# Patient Record
Sex: FEMALE | Race: ASIAN | NOT HISPANIC OR LATINO | URBAN - METROPOLITAN AREA
[De-identification: names, ages, dates, MRNs, and addresses within clinical notes are randomized per-mention and may not be internally consistent; named-entity substitution may affect disease eponyms.]

---

## 2017-02-16 ENCOUNTER — OFFICE VISIT - RIVER FALLS (OUTPATIENT)
Dept: FAMILY MEDICINE | Facility: CLINIC | Age: 57
End: 2017-02-16

## 2017-02-16 ASSESSMENT — MIFFLIN-ST. JEOR: SCORE: 1080.93

## 2017-02-23 ENCOUNTER — OFFICE VISIT - RIVER FALLS (OUTPATIENT)
Dept: FAMILY MEDICINE | Facility: CLINIC | Age: 57
End: 2017-02-23

## 2017-02-23 ASSESSMENT — MIFFLIN-ST. JEOR: SCORE: 1071.86

## 2017-03-02 ENCOUNTER — OFFICE VISIT - RIVER FALLS (OUTPATIENT)
Dept: FAMILY MEDICINE | Facility: CLINIC | Age: 57
End: 2017-03-02

## 2017-03-02 ASSESSMENT — MIFFLIN-ST. JEOR: SCORE: 1074.58

## 2017-03-10 ENCOUNTER — OFFICE VISIT - RIVER FALLS (OUTPATIENT)
Dept: FAMILY MEDICINE | Facility: CLINIC | Age: 57
End: 2017-03-10

## 2017-03-10 ASSESSMENT — MIFFLIN-ST. JEOR: SCORE: 1072.77

## 2017-04-06 ENCOUNTER — OFFICE VISIT - RIVER FALLS (OUTPATIENT)
Dept: FAMILY MEDICINE | Facility: CLINIC | Age: 57
End: 2017-04-06

## 2017-04-06 ASSESSMENT — MIFFLIN-ST. JEOR: SCORE: 1081.84

## 2022-02-11 VITALS
SYSTOLIC BLOOD PRESSURE: 130 MMHG | BODY MASS INDEX: 24.62 KG/M2 | HEART RATE: 64 BPM | DIASTOLIC BLOOD PRESSURE: 80 MMHG | HEIGHT: 61 IN | TEMPERATURE: 97.5 F | WEIGHT: 128.4 LBS | BODY MASS INDEX: 24.24 KG/M2 | SYSTOLIC BLOOD PRESSURE: 146 MMHG | TEMPERATURE: 97.9 F | WEIGHT: 130.4 LBS | BODY MASS INDEX: 24.35 KG/M2 | DIASTOLIC BLOOD PRESSURE: 84 MMHG | TEMPERATURE: 97.6 F | SYSTOLIC BLOOD PRESSURE: 136 MMHG | HEART RATE: 58 BPM | HEART RATE: 52 BPM | HEIGHT: 61 IN | HEIGHT: 61 IN | DIASTOLIC BLOOD PRESSURE: 82 MMHG | WEIGHT: 129 LBS

## 2022-02-11 VITALS
BODY MASS INDEX: 24.28 KG/M2 | HEART RATE: 60 BPM | WEIGHT: 128.6 LBS | SYSTOLIC BLOOD PRESSURE: 110 MMHG | WEIGHT: 130.6 LBS | TEMPERATURE: 97.3 F | HEIGHT: 61 IN | HEART RATE: 76 BPM | SYSTOLIC BLOOD PRESSURE: 114 MMHG | DIASTOLIC BLOOD PRESSURE: 76 MMHG | BODY MASS INDEX: 24.66 KG/M2 | TEMPERATURE: 97.8 F | DIASTOLIC BLOOD PRESSURE: 70 MMHG | HEIGHT: 61 IN

## 2022-02-16 NOTE — LETTER
(Inserted Image. Unable to display)   February 21, 2019      JENNY ALAS  [address] [place] [address]        Dear JENNY,      Thank you for selecting [facility] for your healthcare needs.     Given that the EGD in November 2016 showed gastric erosions and intestinal metaplasia we had discussed a follow up EGD in November 2018. Please make an appointment to discuss.          Please contact me or my assistant at [phone] if you have any questions or concerns.     Sincerely,        Latrell Kline MD

## 2022-02-16 NOTE — PROGRESS NOTES
Patient:   JENNY ALAS            MRN: [id number]            FIN: [id number]               Age:   56 years     Sex:  Female     :  1960   Associated Diagnoses:   Preoperative examination; Right shoulder pain   Author:   Gunnar Kline MD      Preoperative Information   Indication for surgery:  Right shoulder pain.       Chief Complaint   2017 1:55 PM CDT     Preop DOS 17 right shoulder  Dr. Gaspar @  [facility]   Slipped and fell walking her dog landing on the right shoulder. Has persistent pain and weakness.  No history of heart disease. Taking lipitor for over 10 years (family history of hyperlipidemia).         Review of Systems   Constitutional:  No fever, No chills, No sweats.    Ear/Nose/Mouth/Throat:  Negative.    Respiratory:  No shortness of breath.    Cardiovascular:  No chest pain.    Gastrointestinal:  No nausea, No vomiting, No diarrhea.    Genitourinary:  Negative.    Musculoskeletal:  Negative.    Integumentary:  No rash.    Neurologic:  Alert and oriented X4, No headache.    Psychiatric:  Negative.    All other systems reviewed and negative   Last mens2015.    No history of bleeding disorders or blood transfusion  No prior problems with anesthesia  No family history of anesthesia problems  No positive responses for sleep apnea  Denies plavix, coumadin  Denies history of DVT/PE  Denies recent corticosteroid use    Able to walk a flight of stairs without chest pain or shortness of breath.  Nonsmoker  Stopped drinking alcohol with stomach ulcers in November.      Health Status   Allergies:    Allergic Reactions (Selected)  Severity Not Documented  Penicillin (No reactions were documented)   Medications:  (Selected)   Prescriptions  Prescribed  Naprosyn 500 mg oral tablet: 1 tab(s) ( 500 mg ), PO, BID, # 28 tab(s), 0 Refill(s), Type: Maintenance, Pharmacy: [id number], 1 tab(s) po bid,x14 day(s)  Protonix 40 mg oral delayed release tablet: 1 tab(s) (  40 mg ), po, daily, # 60 tab(s), 2 Refill(s), Type: Maintenance, Pharmacy: [facility] [id number], 1 tab(s) po daily  atorvastatin 40 mg oral tablet: 1 tab(s) ( 40 mg ), po, daily, Instructions: Due for fasting labs and yearly physical, # 90 tab(s), 3 Refill(s), Type: Maintenance, Pharmacy: [facility] [id number], 1 tab(s) po daily,Instr:Due for fasting labs and yearly physical  citalopram 40 mg oral tablet: See Instructions, Instructions: TAKE 1 TABLET BY MOUTH DAILY, # 90 tab(s), 2 Refill(s), Type: Soft Stop, Pharmacy: [facility] [id number], TAKE 1 TABLET BY MOUTH DAILY   Problem list:    All Problems  Abnormal Pap Smear / ICD-9-.9 / Confirmed  Hypercholesteremia / ICD-9-.0 / Confirmed  Mild Depression / ICD-9-.21 / Confirmed  Inactive: Tobacco abuse / ICD-9-.1  Resolved: Normal hearing eval 11      Histories   Family History: Dad  stomach cancer age 79. Mom  Lymphoma age 77.   Procedure history:    Esophagogastroduodenoscopy (SNOMED CT 074768680) performed by Gunnar Kline MD on 11/15/2016 at 56 Years.  Comments:  2017 1:59 PM - Lisa Bello  Indication:  gastric ulcers.  Sedation: Versed 2 mg, Fentanyl 50 mcg, 2 sprays of Cetacaine spray.  MMT (Medial Meniscus Tear) (ICD-9-.0) performed by Jasiel Badillo MD on 2015 at 54 Years.  Comments:  2015 8:01 AM - Lisa Bello  Right knee lateral.  Colonoscopy (SNOMED CT 116506842) on 3/10/2011 at 50 Years.  Comments:  3/21/2011 8:44 AM - Ugo Narvaez MA  Needs MAC sedation with next colonoscopy    3/11/2011 3:39 PM - Ugo Narvaez MA  Normal colonoscopy repeat in 10 years  benign lump removed from neck.  benign lump in breast removed.  Childbirth (SNOMED CT 8085981521).  Comments:  3/1/2012 11:30 AM - Barbara Shepherd  Colonoscopy 2011 repeat 10 years.   Social History:  ([place]). [place] . 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    Eye:  Normal conjunctiva.    HENT:  Normocephalic, Tympanic membranes are clear.    Neck:  Non-tender, No lymphadenopathy.    Respiratory:  Lungs are clear to auscultation, Breath sounds are equal.    Cardiovascular:  Normal rate, Regular rhythm, No murmur.    Gastrointestinal:  Soft, Non-tender, Normal bowel sounds, No organomegaly.    Musculoskeletal:  Normal range of motion, Normal gait.    Integumentary:  Warm, Dry, Pink.    Neurologic:  Alert, Oriented, Normal sensory, No focal deficits.    Psychiatric:  Cooperative, Appropriate mood & affect.       Impression and Plan   Diagnosis     Preoperative examination (HGU01-BV Z01.818).     Right shoulder pain (YHR01-EC M25.511).       Recommend stopping naprosyn a week before the procedure.  No contraindications to anesthesia

## 2022-02-16 NOTE — PROGRESS NOTES
Chief Complaint  Had a fall 5 days ago. Right shoulder pain since and pain is getting worse  History of Present Illness  Fell down and landed on the right shoulder. Pain is increasing. Injury happened a week ago walking dog slipping on ice. Had been able to move it well the first few days. Has been having decreased ROM with increasing pain. Having a hard time lifting her fork  Review of Systems  Denies weakness in right arm  Denies numbness and tingling  Rarely has epigastric pain and taking the protonix  Problem List/Past Medical History  Ongoing  Abnormal Pap Smear  Hypercholesteremia  Mild Depression  Resolved  Normal hearing eval 2/21/11  Procedure/Surgical History  Esophagogastroduodenoscopy (11/15/2016),  MMT (Medial Meniscus Tear) (01/20/2015),  Colonoscopy (03/10/2011),  benign lump in breast removed,  benign lump removed from neck,  Childbirth,  Colonoscopy 2011 repeat 10 years.  Home Medications  atorvastatin 40 mg oral tablet, 40 mg, 1 tab(s), po, daily, 3 refills  Carafate 1 g oral tablet, 1 gm, 1 tab(s), po, qid  citalopram 40 mg oral tablet, 2 refills  Protonix 40 mg oral delayed release tablet, 40 mg, 1 tab(s), po, daily, 2 refills  Allergies  penicillin  Social History  Home and Environment  Marital status: .  Tobacco  Past  Family History  Carcinoma: Father.  Carcinoma in situ of body of stomach: Father.  Immunizations  Physical Exam  Vitals & Measurements  T:?97.6?(Tympanic)?  HR:?52?(Peripheral)?  BP:?146/82?  HT:?60.5?in?  WT:?130.4?lb?  BMI:?25.05?  General: No acute distress  Musculoskeletal: Able to?abduct the right shoulder 90?. ?Limited more by pain and weakness. ?Does have strength in the rotator cuff muscles. ?There is tenderness over the superior point shoulder  Skin: No bruising or swelling  Lab Results  Diagnostic Results  Right shoulder x-ray: No fracture and images reviewed with patient  No evidence of rotator cuff tear at this time.  Assessment/Plan  Right shoulder  pain  Likely contusion. ?Will take Naprosyn twice daily for 2 weeks and follow-up in 7 days but sooner if worse.? Given restrictions with no lifting with the right arm. 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 Sling for comfort.  Orders:  naproxen, 1 tab(s) ( 500 mg ), PO, BID, # 28 tab(s), 0 Refill(s), Type: Maintenance, Pharmacy: [facility] [id number], 1 tab(s) po bid,x14 day(s)  XR Shoulder Right (Request)

## 2022-02-16 NOTE — PROGRESS NOTES
Patient:   JENNY ALAS            MRN: [id number]            FIN: [id number]               Age:   56 years     Sex:  Female     :  1960   Associated Diagnoses:   Contusion of right shoulder   Author:   Gunnar Kline MD      Visit Information      Date of Service: 03/10/2017 12:56 pm  Performing Location: [facility] [place]  Encounter#: [id number]      Primary Care Provider (PCP):  [id number] -UNKNOWN,      Referring Provider:  No referring provider recorded for selected visit.      Chief Complaint   3/10/2017 1:00 PM CST    Right shoulder follow up, same pain, not better        History of Present Illness   Fell down and landed on the right shoulder. Pain is decreasing. Injury happened one month ago walking dog slipping on ice. Had been able to move it well the first few days. Since starting naprosyn the pain is decreased and range of motion improved. Feels pain is limiting her and may have some weakness as difficult opening jars.      Review of Systems   Some numbness and tingling with sleeping  No fevers         Health Status   Allergies:    Allergic Reactions (Selected)  Severity Not Documented  Penicillin (No reactions were documented)   Medications:  (Selected)   Prescriptions  Prescribed  Naprosyn 500 mg oral tablet: 1 tab(s) ( 500 mg ), PO, BID, # 28 tab(s), 0 Refill(s), Type: Maintenance, Pharmacy: [id number], 1 tab(s) po bid,x14 day(s)  Protonix 40 mg oral delayed release tablet: 1 tab(s) ( 40 mg ), po, daily, # 60 tab(s), 2 Refill(s), Type: Maintenance, Pharmacy: [facility] [id number], 1 tab(s) po daily  atorvastatin 40 mg oral tablet: 1 tab(s) ( 40 mg ), po, daily, Instructions: Due for fasting labs and yearly physical, # 90 tab(s), 3 Refill(s), Type: Maintenance, Pharmacy: [facility] [id number], 1 tab(s) po daily,Instr:Due for fasting labs and yearly physical  citalopram 40 mg oral tablet: See Instructions, Instructions: TAKE 1 TABLET BY MOUTH DAILY, # 90 tab(s), 2  Refill(s), Type: Soft Stop, Pharmacy: [facility] [id number], TAKE 1 TABLET BY MOUTH DAILY   Problem list:    All Problems  Abnormal Pap Smear / ICD-9-.9 / Confirmed  Hypercholesteremia / ICD-9-.0 / Confirmed  Mild Depression / ICD-9-.21 / Confirmed  Inactive: Tobacco abuse / ICD-9-.1  Resolved: Normal hearing eval 11      Histories   Procedure history:    Esophagogastroduodenoscopy (SNOMED CT 285222054) performed by Gunnar Kline MD on 11/15/2016 at 56 Years.  Comments:  2017 1:59 PM - Lisa Bello  Indication:  gastric ulcers.  Sedation: Versed 2 mg, Fentanyl 50 mcg, 2 sprays of Cetacaine spray.  MMT (Medial Meniscus Tear) (ICD-9-.0) performed by Jasiel Badillo MD on 2015 at 54 Years.  Comments:  2015 8:01 AM - Lisa Bello  Right knee lateral.  Colonoscopy (SNOMED CT 81959) on 3/10/2011 at 50 Years.  Comments:  3/21/2011 8:44 AM - Ugo Narvaez MA  Needs MAC sedation with next colonoscopy    3/11/2011 3:39 PM - Ugo Narvaez MA  Normal colonoscopy repeat in 10 years  benign lump removed from neck.  benign lump in breast removed.  Childbirth (SNOMED CT 1746268409).  Comments:  3/1/2012 11:30 AM - Barbara Shepherd  Colonoscopy 2011 repeat 10 years.   Social History:       Physical Examination   Vital Signs   3/10/2017 1:00 PM CST Systolic Blood Pressure 114 mmHg    Diastolic Blood Pressure 76 mmHg      General: No acute distress  Musculoskeletal: Able to abduct the right shoulder 145 degrees.  Has good strength in the rotator cuff muscles.  There is tenderness over the superior point shoulder. Deal and Neers test slightly positive. Yeargesons and speeds maneuver slightly positive. O'Briens positive  Skin: no bruising and swelling   Respiratory:  Lungs are clear to auscultation.    Cardiovascular:  Normal rate, Regular rhythm.       Impression and Plan   Diagnosis     Contusion of right shoulder (QVS35-EG S40.011A).     Persistent pain and  some decreased strength. MRI reviewed and glenoid labral tear. Refer to PT and Orthopedics to see if labral tear is causing the pain. Restrictions moderate right shoulder.    Blood pressure: good today and likely elevated recently due to right shoulder pain.

## 2022-02-16 NOTE — PROGRESS NOTES
Patient:   JENNY ALAS            MRN: [id number]            FIN: [id number]               Age:   56 years     Sex:  Female     :  1960   Associated Diagnoses:   Contusion of right shoulder   Author:   Gunnar Kline MD      Visit Information      Date of Service: 2017 01:41 pm  Performing Location: [facility] [place]  Encounter#: [id number]      Primary Care Provider (PCP):  [id number] -UNKNOWN,      Referring Provider:  No referring provider recorded for selected visit.      Chief Complaint   3/2/2017 1:51 PM CST     feels very little progress with right shoulder. Still painful        History of Present Illness   Fell down and landed on the right shoulder. Pain is decreasing. Injury happened three weeks ago walking dog slipping on ice. Had been able to move it well the first few days. Since starting naprosyn the pain is decreased and range of motion improved. Feels pain is limiting her and not weakness but having some difficulty lifting things.      Review of Systems   Denies weakness in right arm  Denies numbness and tingling  Rarely has epigastric pain and taking the protonix      Health Status   Allergies:    Allergic Reactions (Selected)  Severity Not Documented  Penicillin (No reactions were documented)   Medications:  (Selected)   Prescriptions  Prescribed  Naprosyn 500 mg oral tablet: 1 tab(s) ( 500 mg ), PO, BID, # 28 tab(s), 0 Refill(s), Type: Maintenance, Pharmacy: [facility] [id number], 1 tab(s) po bid,x14 day(s)  Protonix 40 mg oral delayed release tablet: 1 tab(s) ( 40 mg ), po, daily, # 60 tab(s), 2 Refill(s), Type: Maintenance, Pharmacy: [facility] [id number], 1 tab(s) po daily  atorvastatin 40 mg oral tablet: 1 tab(s) ( 40 mg ), po, daily, Instructions: Due for fasting labs and yearly physical, # 90 tab(s), 3 Refill(s), Type: Maintenance, Pharmacy: [facility] [id number], 1 tab(s) po daily,Instr:Due for fasting labs and yearly physical  citalopram 40 mg oral tablet:  See Instructions, Instructions: TAKE 1 TABLET BY MOUTH DAILY, # 90 tab(s), 2 Refill(s), Type: Soft Stop, Pharmacy: [id number], TAKE 1 TABLET BY MOUTH DAILY   Problem list:    All Problems  Abnormal Pap Smear / ICD-9-.9 / Confirmed  Hypercholesteremia / ICD-9-.0 / Confirmed  Mild Depression / ICD-9-.21 / Confirmed  Inactive: Tobacco abuse / ICD-9-.1  Resolved: Normal hearing eval 11      Histories   Procedure history:    Esophagogastroduodenoscopy (SNOMED CT 866579623) performed by Gunnar Kline MD on 11/15/2016 at 56 Years.  Comments:  2017 1:59 PM - Lisa Bello  Indication:  gastric ulcers.  Sedation: Versed 2 mg, Fentanyl 50 mcg, 2 sprays of Cetacaine spray.  MMT (Medial Meniscus Tear) (ICD-9-.0) performed by Jasiel Badillo MD on 2015 at 54 Years.  Comments:  2015 8:01 AM - Lisa Bello  Right knee lateral.  Colonoscopy (SNOMED CT 533235059) on 3/10/2011 at 50 Years.  Comments:  3/21/2011 8:44 AM - Ugo Narvaez MA  Needs MAC sedation with next colonoscopy    3/11/2011 3:39 PM - Ugo Narvaez MA  Normal colonoscopy repeat in 10 years  benign lump removed from neck.  benign lump in breast removed.  Childbirth (SNOMED CT 9760568944).  Comments:  3/1/2012 11:30 AM - Barbara Shepherd  Colonoscopy 2011 repeat 10 years.   Social History:       Physical Examination   Vital Signs   3/2/2017 2:12 PM CST Systolic Blood Pressure 136 mmHg    Diastolic Blood Pressure 80 mmHg      General: No acute distress  Musculoskeletal: Able to abduct the right shoulder 120 degrees.  Has good strength in the rotator cuff muscles.  There is tenderness over the superior point shoulder. Deal and Neers test slightly positive. Yeargesons and speeds maneuver slightly positive. O'Briens positive  Skin: no bruising and swelling   Respiratory:  Lungs are clear to auscultation.    Cardiovascular:  Normal rate, Regular rhythm.       Impression and Plan   Diagnosis      Contusion of right shoulder (SIV43-ZR S40.011A).     Persistent pain and some decreased strength. Schedule MRI. Continue shoulder strengthening exercises and restart naprosyn. Follow up March 10th and sooner if worse. Restrictions moderate right shoulder.    Blood pressure: continue to monitor

## 2022-02-16 NOTE — PROGRESS NOTES
Patient:   JENNY ALAS            MRN: [id number]            FIN: [id number]               Age:   56 years     Sex:  Female     :  1960   Associated Diagnoses:   Contusion of right shoulder   Author:   Gunnar Kline MD      Visit Information      Date of Service: 2017 08:33 am  Performing Location: [facility] [place]  Encounter#: [id number]      Primary Care Provider (PCP):  RF97 -UNKNOWN,      Referring Provider:  No referring provider recorded for selected visit.      Chief Complaint   2017 8:40 AM CST    recheck right shoulder pain. Has increased ROM and decreased pain but pain still present. Still feels some weakness in right shoulder.      History of Present Illness   Fell down and landed on the right shoulder. Pain is decreasing. Injury happened two weeks ago walking dog slipping on ice. Had been able to move it well the first few days. Since starting naprosyn the pain is decreased and range of motion improved. Feels pain is limiting her and not weakness.      Review of Systems   Denies weakness in right arm  Denies numbness and tingling  Rarely has epigastric pain and taking the protonix      Health Status   Allergies:    Allergic Reactions (Selected)  Severity Not Documented  Penicillin (No reactions were documented)   Medications:  (Selected)   Prescriptions  Prescribed  Naprosyn 500 mg oral tablet: 1 tab(s) ( 500 mg ), PO, BID, # 28 tab(s), 0 Refill(s), Type: Maintenance, Pharmacy: [facility] [id number], 1 tab(s) po bid,x14 day(s)  Protonix 40 mg oral delayed release tablet: 1 tab(s) ( 40 mg ), po, daily, # 60 tab(s), 2 Refill(s), Type: Maintenance, Pharmacy: [facility] [id number], 1 tab(s) po daily  atorvastatin 40 mg oral tablet: 1 tab(s) ( 40 mg ), po, daily, Instructions: Due for fasting labs and yearly physical, # 90 tab(s), 3 Refill(s), Type: Maintenance, Pharmacy: [facility] [id number], 1 tab(s) po daily,Instr:Due for fasting labs and yearly  physical  citalopram 40 mg oral tablet: See Instructions, Instructions: TAKE 1 TABLET BY MOUTH DAILY, # 90 tab(s), 2 Refill(s), Type: Soft Stop, Pharmacy: [facility] [id number], TAKE 1 TABLET BY MOUTH DAILY   Problem list:    All Problems  Abnormal Pap Smear / ICD-9-.9 / Confirmed  Hypercholesteremia / ICD-9-.0 / Confirmed  Mild Depression / ICD-9-.21 / Confirmed  Inactive: Tobacco abuse / ICD-9-.1  Resolved: Normal hearing eval 2/21/11      Histories   Social History:       Physical Examination   Vital Signs   2/23/2017 8:40 AM CST Temperature Tympanic 97.9 DegF    Peripheral Pulse Rate 64 bpm    Systolic Blood Pressure 130 mmHg    Diastolic Blood Pressure 84 mmHg      General: No acute distress  Musculoskeletal: Able to abduct the right shoulder fully.  Has good strength in the rotator cuff muscles.  There is tenderness over the superior point shoulder. Deal and Neers test slightly positive. Yeargesons and speeds maneuver negative.  Skin: no bruising and swelling      Review / Management   Do not feel MRI indicated at this time and discussed with patient.      Impression and Plan   Diagnosis     Contusion of right shoulder (RSA74-HB S40.011A).     Doubt rotator cuff tear. Recommend shoulder strengthening exercises, finish naprosyn and follow up in 1 week and sooner if worse. Restrictions moderate right shoulder.

## 2022-05-18 ENCOUNTER — TRANSFERRED RECORDS (OUTPATIENT)
Dept: HEALTH INFORMATION MANAGEMENT | Facility: CLINIC | Age: 62
End: 2022-05-18

## 2022-06-01 ENCOUNTER — TRANSFERRED RECORDS (OUTPATIENT)
Dept: HEALTH INFORMATION MANAGEMENT | Facility: CLINIC | Age: 62
End: 2022-06-01

## 2022-06-15 ENCOUNTER — TRANSFERRED RECORDS (OUTPATIENT)
Dept: HEALTH INFORMATION MANAGEMENT | Facility: CLINIC | Age: 62
End: 2022-06-15